# Patient Record
Sex: MALE | Race: WHITE | Employment: OTHER | URBAN - METROPOLITAN AREA
[De-identification: names, ages, dates, MRNs, and addresses within clinical notes are randomized per-mention and may not be internally consistent; named-entity substitution may affect disease eponyms.]

---

## 2017-03-04 ENCOUNTER — HOSPITAL ENCOUNTER (OUTPATIENT)
Dept: MRI IMAGING | Age: 67
Discharge: HOME | End: 2017-03-04
Attending: NEUROLOGICAL SURGERY
Payer: MEDICARE

## 2017-03-04 DIAGNOSIS — G30.1 ALZHEIMER'S DISEASE WITH LATE ONSET (CODE) (HCC): ICD-10-CM

## 2017-03-04 PROCEDURE — 70551 MRI BRAIN STEM W/O DYE: CPT

## 2017-11-01 PROBLEM — F03.90 DEMENTIA WITHOUT BEHAVIORAL DISTURBANCE (HCC): Status: ACTIVE | Noted: 2017-11-01

## 2017-11-01 PROBLEM — F03.90 DEMENTIA WITHOUT BEHAVIORAL DISTURBANCE (HCC): Chronic | Status: ACTIVE | Noted: 2017-11-01

## 2017-11-01 PROBLEM — I10 ESSENTIAL HYPERTENSION: Status: ACTIVE | Noted: 2017-11-01

## 2017-11-01 PROBLEM — E78.00 PURE HYPERCHOLESTEROLEMIA: Chronic | Status: ACTIVE | Noted: 2017-11-01

## 2017-11-01 PROBLEM — K21.9 GASTROESOPHAGEAL REFLUX DISEASE WITHOUT ESOPHAGITIS: Chronic | Status: ACTIVE | Noted: 2017-11-01

## 2017-11-01 PROBLEM — E78.00 PURE HYPERCHOLESTEROLEMIA: Status: ACTIVE | Noted: 2017-11-01

## 2017-11-01 PROBLEM — E11.42 TYPE 2 DIABETES MELLITUS WITH DIABETIC POLYNEUROPATHY, WITHOUT LONG-TERM CURRENT USE OF INSULIN (HCC): Status: ACTIVE | Noted: 2017-11-01

## 2017-11-01 PROBLEM — E11.42 TYPE 2 DIABETES MELLITUS WITH DIABETIC POLYNEUROPATHY, WITHOUT LONG-TERM CURRENT USE OF INSULIN (HCC): Chronic | Status: ACTIVE | Noted: 2017-11-01

## 2017-11-01 PROBLEM — K21.9 GASTROESOPHAGEAL REFLUX DISEASE WITHOUT ESOPHAGITIS: Status: ACTIVE | Noted: 2017-11-01

## 2017-11-01 PROBLEM — I10 ESSENTIAL HYPERTENSION: Chronic | Status: ACTIVE | Noted: 2017-11-01

## 2017-11-10 PROBLEM — F33.1 MODERATE EPISODE OF RECURRENT MAJOR DEPRESSIVE DISORDER (HCC): Chronic | Status: ACTIVE | Noted: 2017-11-10

## 2017-11-10 PROBLEM — E78.2 MIXED HYPERLIPIDEMIA: Chronic | Status: ACTIVE | Noted: 2017-11-10

## 2017-11-10 PROBLEM — E78.00 PURE HYPERCHOLESTEROLEMIA: Chronic | Status: RESOLVED | Noted: 2017-11-01 | Resolved: 2017-11-10

## 2017-11-12 PROBLEM — J30.1 SEASONAL ALLERGIC RHINITIS DUE TO POLLEN: Chronic | Status: ACTIVE | Noted: 2017-11-12

## 2017-11-12 PROBLEM — L21.9 SEBORRHEIC DERMATITIS: Chronic | Status: ACTIVE | Noted: 2017-11-12

## 2018-01-09 ENCOUNTER — HOSPITAL ENCOUNTER (OUTPATIENT)
Dept: CT IMAGING | Age: 68
Discharge: HOME | End: 2018-01-09
Attending: OTOLARYNGOLOGY
Payer: MEDICARE

## 2018-01-09 DIAGNOSIS — R51.9 FREQUENT HEADACHES: ICD-10-CM

## 2018-01-09 DIAGNOSIS — J34.89 SINUS PRESSURE: ICD-10-CM

## 2018-01-09 PROCEDURE — 70486 CT MAXILLOFACIAL W/O DYE: CPT

## 2018-04-02 PROBLEM — E11.21 TYPE 2 DIABETES WITH NEPHROPATHY (HCC): Status: ACTIVE | Noted: 2018-04-02

## 2018-05-29 VITALS — HEIGHT: 74 IN | BODY MASS INDEX: 23.1 KG/M2 | WEIGHT: 180 LBS

## 2018-05-29 RX ORDER — OXYMETAZOLINE HCL 0.05 %
2 SPRAY, NON-AEROSOL (ML) NASAL
Status: CANCELLED | OUTPATIENT
Start: 2018-06-05

## 2018-05-29 NOTE — PERIOP NOTES
Patient verified name, , and surgery as listed in [facility], [place]. Type 1b surgery, phone assessment completed with patient's wife, Isa De Guzman has dementia. Orders YES received. Labs per surgeon: none  Labs per anesthesia protocol: potassium~to be drawn on day of surgery      Patient answered medical/surgical history questions at their best of ability. All prior to admission medications documented in [facility]. Patient instructed to take the following medications the day of surgery according to anesthesia guidelines with a small sip of water: Ativan, Namenda, Pantoprazole, Effexor . Hold all vitamins 7 days prior to surgery and NSAIDS 5 days prior to surgery. Medications to be held none    Patient instructed on the following:  Arrive at A Entrance, time of arrival to be called the day before by 1700  NPO after midnight including gum, mints, and ice chips  Responsible adult must drive patient to the hospital, stay during surgery, and patient will  need supervision 24 hours after anesthesia  Use antibacterial soap in shower the night before surgery and on the morning of surgery  Leave all valuables (money and jewelry) at home but bring insurance card and ID on       DOS  Do not wear make-up, nail polish, lotions, cologne, perfumes, powders, or oil on skin. Patient teach back successful and patient demonstrates knowledge of instruction.

## 2018-05-30 ENCOUNTER — HOSPITAL ENCOUNTER (OUTPATIENT)
Dept: SURGERY | Age: 68
Discharge: HOME | End: 2018-05-30

## 2018-06-04 ENCOUNTER — ANESTHESIA EVENT (OUTPATIENT)
Dept: SURGERY | Age: 68
End: 2018-06-04
Payer: MEDICARE

## 2018-06-05 ENCOUNTER — ANESTHESIA (OUTPATIENT)
Dept: SURGERY | Age: 68
End: 2018-06-05
Payer: MEDICARE

## 2018-06-05 ENCOUNTER — HOSPITAL ENCOUNTER (OUTPATIENT)
Age: 68
Setting detail: OUTPATIENT SURGERY
Discharge: HOME | End: 2018-06-05
Attending: OTOLARYNGOLOGY | Admitting: OTOLARYNGOLOGY
Payer: MEDICARE

## 2018-06-05 VITALS
OXYGEN SATURATION: 97 % | BODY MASS INDEX: 23.1 KG/M2 | SYSTOLIC BLOOD PRESSURE: 152 MMHG | TEMPERATURE: 98.2 F | HEART RATE: 88 BPM | HEIGHT: 74 IN | RESPIRATION RATE: 15 BRPM | DIASTOLIC BLOOD PRESSURE: 76 MMHG | WEIGHT: 180 LBS

## 2018-06-05 LAB
GLUCOSE BLD STRIP.AUTO-MCNC: 111 MG/DL (ref 65–100)
POTASSIUM BLD-SCNC: 4.6 MMOL/L (ref 3.5–5.1)

## 2018-06-05 PROCEDURE — 76060000033 HC ANESTHESIA 1 TO 1.5 HR: Performed by: OTOLARYNGOLOGY

## 2018-06-05 PROCEDURE — 77030006907 HC BLD SNUS SHV MEDT -C: Performed by: OTOLARYNGOLOGY

## 2018-06-05 PROCEDURE — 74011250636 HC RX REV CODE- 250/636: Performed by: ANESTHESIOLOGY

## 2018-06-05 PROCEDURE — 77030036727 HC DEV INFL BLN SNUS SE DISP ACCL -B: Performed by: OTOLARYNGOLOGY

## 2018-06-05 PROCEDURE — 77030002888 HC SUT CHRMC J&J -A: Performed by: OTOLARYNGOLOGY

## 2018-06-05 PROCEDURE — 76010000149 HC OR TIME 1 TO 1.5 HR: Performed by: OTOLARYNGOLOGY

## 2018-06-05 PROCEDURE — 74011250636 HC RX REV CODE- 250/636

## 2018-06-05 PROCEDURE — 77030008357 HC SPLNT NSL INT THOM -B: Performed by: OTOLARYNGOLOGY

## 2018-06-05 PROCEDURE — 77030021678 HC GLIDESCP STAT DISP VERT -B: Performed by: ANESTHESIOLOGY

## 2018-06-05 PROCEDURE — 77030018836 HC SOL IRR NACL ICUM -A: Performed by: OTOLARYNGOLOGY

## 2018-06-05 PROCEDURE — 74011250637 HC RX REV CODE- 250/637: Performed by: OTOLARYNGOLOGY

## 2018-06-05 PROCEDURE — 74011000250 HC RX REV CODE- 250: Performed by: OTOLARYNGOLOGY

## 2018-06-05 PROCEDURE — 77030014153 HC WND COBLATN ENT S&N -C: Performed by: OTOLARYNGOLOGY

## 2018-06-05 PROCEDURE — 84132 ASSAY OF SERUM POTASSIUM: CPT

## 2018-06-05 PROCEDURE — 77030008703 HC TU ET UNCUF COVD -A: Performed by: ANESTHESIOLOGY

## 2018-06-05 PROCEDURE — 82962 GLUCOSE BLOOD TEST: CPT

## 2018-06-05 PROCEDURE — 77030002996 HC SUT SLK J&J -A: Performed by: OTOLARYNGOLOGY

## 2018-06-05 PROCEDURE — 74011250637 HC RX REV CODE- 250/637: Performed by: ANESTHESIOLOGY

## 2018-06-05 PROCEDURE — 77030036884 HC CATH GD SPNPLS RELV TIP DISP KT ACCL -G1: Performed by: OTOLARYNGOLOGY

## 2018-06-05 PROCEDURE — 77030020782 HC GWN BAIR PAWS FLX 3M -B: Performed by: ANESTHESIOLOGY

## 2018-06-05 PROCEDURE — 77030009845 HC ONTMNT BACITRCN PATT -A: Performed by: OTOLARYNGOLOGY

## 2018-06-05 PROCEDURE — 77030020255 HC SOL INJ LR 1000ML BG: Performed by: OTOLARYNGOLOGY

## 2018-06-05 PROCEDURE — 74011000250 HC RX REV CODE- 250

## 2018-06-05 PROCEDURE — 76210000016 HC OR PH I REC 1 TO 1.5 HR: Performed by: OTOLARYNGOLOGY

## 2018-06-05 PROCEDURE — 77030008477 HC STYL SATN SLP COVD -A: Performed by: ANESTHESIOLOGY

## 2018-06-05 RX ORDER — OXYMETAZOLINE HCL 0.05 %
2 SPRAY, NON-AEROSOL (ML) NASAL ONCE
Status: COMPLETED | OUTPATIENT
Start: 2018-06-05 | End: 2018-06-05

## 2018-06-05 RX ORDER — LIDOCAINE HYDROCHLORIDE 10 MG/ML
0.1 INJECTION INFILTRATION; PERINEURAL AS NEEDED
Status: DISCONTINUED | OUTPATIENT
Start: 2018-06-05 | End: 2018-06-05 | Stop reason: HOSPADM

## 2018-06-05 RX ORDER — DEXAMETHASONE SODIUM PHOSPHATE 4 MG/ML
INJECTION, SOLUTION INTRA-ARTICULAR; INTRALESIONAL; INTRAMUSCULAR; INTRAVENOUS; SOFT TISSUE AS NEEDED
Status: DISCONTINUED | OUTPATIENT
Start: 2018-06-05 | End: 2018-06-05 | Stop reason: HOSPADM

## 2018-06-05 RX ORDER — SODIUM CHLORIDE 0.9 % (FLUSH) 0.9 %
5-10 SYRINGE (ML) INJECTION AS NEEDED
Status: DISCONTINUED | OUTPATIENT
Start: 2018-06-05 | End: 2018-06-05 | Stop reason: HOSPADM

## 2018-06-05 RX ORDER — MIDAZOLAM HYDROCHLORIDE 1 MG/ML
2 INJECTION, SOLUTION INTRAMUSCULAR; INTRAVENOUS
Status: DISCONTINUED | OUTPATIENT
Start: 2018-06-05 | End: 2018-06-05 | Stop reason: HOSPADM

## 2018-06-05 RX ORDER — ACETAMINOPHEN 500 MG
1000 TABLET ORAL ONCE
Status: COMPLETED | OUTPATIENT
Start: 2018-06-05 | End: 2018-06-05

## 2018-06-05 RX ORDER — NEOSTIGMINE METHYLSULFATE 1 MG/ML
INJECTION INTRAVENOUS AS NEEDED
Status: DISCONTINUED | OUTPATIENT
Start: 2018-06-05 | End: 2018-06-05 | Stop reason: HOSPADM

## 2018-06-05 RX ORDER — ALBUTEROL SULFATE 0.83 MG/ML
2.5 SOLUTION RESPIRATORY (INHALATION) AS NEEDED
Status: DISCONTINUED | OUTPATIENT
Start: 2018-06-05 | End: 2018-06-05 | Stop reason: HOSPADM

## 2018-06-05 RX ORDER — FENTANYL CITRATE 50 UG/ML
INJECTION, SOLUTION INTRAMUSCULAR; INTRAVENOUS AS NEEDED
Status: DISCONTINUED | OUTPATIENT
Start: 2018-06-05 | End: 2018-06-05 | Stop reason: HOSPADM

## 2018-06-05 RX ORDER — ROCURONIUM BROMIDE 10 MG/ML
INJECTION, SOLUTION INTRAVENOUS AS NEEDED
Status: DISCONTINUED | OUTPATIENT
Start: 2018-06-05 | End: 2018-06-05 | Stop reason: HOSPADM

## 2018-06-05 RX ORDER — LIDOCAINE HYDROCHLORIDE AND EPINEPHRINE 10; 10 MG/ML; UG/ML
INJECTION, SOLUTION INFILTRATION; PERINEURAL AS NEEDED
Status: DISCONTINUED | OUTPATIENT
Start: 2018-06-05 | End: 2018-06-05 | Stop reason: HOSPADM

## 2018-06-05 RX ORDER — ONDANSETRON 2 MG/ML
4 INJECTION INTRAMUSCULAR; INTRAVENOUS
Status: DISCONTINUED | OUTPATIENT
Start: 2018-06-05 | End: 2018-06-05 | Stop reason: HOSPADM

## 2018-06-05 RX ORDER — LIDOCAINE HYDROCHLORIDE 20 MG/ML
INJECTION, SOLUTION EPIDURAL; INFILTRATION; INTRACAUDAL; PERINEURAL AS NEEDED
Status: DISCONTINUED | OUTPATIENT
Start: 2018-06-05 | End: 2018-06-05 | Stop reason: HOSPADM

## 2018-06-05 RX ORDER — KETOROLAC TROMETHAMINE 30 MG/ML
INJECTION, SOLUTION INTRAMUSCULAR; INTRAVENOUS AS NEEDED
Status: DISCONTINUED | OUTPATIENT
Start: 2018-06-05 | End: 2018-06-05 | Stop reason: HOSPADM

## 2018-06-05 RX ORDER — ONDANSETRON 2 MG/ML
INJECTION INTRAMUSCULAR; INTRAVENOUS AS NEEDED
Status: DISCONTINUED | OUTPATIENT
Start: 2018-06-05 | End: 2018-06-05 | Stop reason: HOSPADM

## 2018-06-05 RX ORDER — HYDROMORPHONE HYDROCHLORIDE 2 MG/ML
0.5 INJECTION, SOLUTION INTRAMUSCULAR; INTRAVENOUS; SUBCUTANEOUS
Status: DISCONTINUED | OUTPATIENT
Start: 2018-06-05 | End: 2018-06-05 | Stop reason: HOSPADM

## 2018-06-05 RX ORDER — GLYCOPYRROLATE 0.2 MG/ML
INJECTION INTRAMUSCULAR; INTRAVENOUS AS NEEDED
Status: DISCONTINUED | OUTPATIENT
Start: 2018-06-05 | End: 2018-06-05 | Stop reason: HOSPADM

## 2018-06-05 RX ORDER — SODIUM CHLORIDE, SODIUM LACTATE, POTASSIUM CHLORIDE, CALCIUM CHLORIDE 600; 310; 30; 20 MG/100ML; MG/100ML; MG/100ML; MG/100ML
1000 INJECTION, SOLUTION INTRAVENOUS CONTINUOUS
Status: DISCONTINUED | OUTPATIENT
Start: 2018-06-05 | End: 2018-06-05 | Stop reason: HOSPADM

## 2018-06-05 RX ORDER — PROPOFOL 10 MG/ML
INJECTION, EMULSION INTRAVENOUS AS NEEDED
Status: DISCONTINUED | OUTPATIENT
Start: 2018-06-05 | End: 2018-06-05 | Stop reason: HOSPADM

## 2018-06-05 RX ORDER — SODIUM CHLORIDE 0.9 % (FLUSH) 0.9 %
5-10 SYRINGE (ML) INJECTION EVERY 8 HOURS
Status: DISCONTINUED | OUTPATIENT
Start: 2018-06-05 | End: 2018-06-05 | Stop reason: HOSPADM

## 2018-06-05 RX ORDER — EPHEDRINE SULFATE 50 MG/ML
INJECTION, SOLUTION INTRAVENOUS AS NEEDED
Status: DISCONTINUED | OUTPATIENT
Start: 2018-06-05 | End: 2018-06-05 | Stop reason: HOSPADM

## 2018-06-05 RX ADMIN — SODIUM CHLORIDE, SODIUM LACTATE, POTASSIUM CHLORIDE, AND CALCIUM CHLORIDE 1000 ML: 600; 310; 30; 20 INJECTION, SOLUTION INTRAVENOUS at 07:27

## 2018-06-05 RX ADMIN — ONDANSETRON 4 MG: 2 INJECTION INTRAMUSCULAR; INTRAVENOUS at 08:13

## 2018-06-05 RX ADMIN — LIDOCAINE HYDROCHLORIDE 100 MG: 20 INJECTION, SOLUTION EPIDURAL; INFILTRATION; INTRACAUDAL; PERINEURAL at 07:57

## 2018-06-05 RX ADMIN — ROCURONIUM BROMIDE 10 MG: 10 INJECTION, SOLUTION INTRAVENOUS at 08:33

## 2018-06-05 RX ADMIN — EPHEDRINE SULFATE 10 MG: 50 INJECTION, SOLUTION INTRAVENOUS at 08:43

## 2018-06-05 RX ADMIN — OXYMETAZOLINE HYDROCHLORIDE 2 SPRAY: 5 SPRAY NASAL at 07:25

## 2018-06-05 RX ADMIN — EPHEDRINE SULFATE 5 MG: 50 INJECTION, SOLUTION INTRAVENOUS at 08:14

## 2018-06-05 RX ADMIN — ACETAMINOPHEN 1000 MG: 500 TABLET, FILM COATED ORAL at 07:25

## 2018-06-05 RX ADMIN — EPHEDRINE SULFATE 10 MG: 50 INJECTION, SOLUTION INTRAVENOUS at 08:17

## 2018-06-05 RX ADMIN — ROCURONIUM BROMIDE 10 MG: 10 INJECTION, SOLUTION INTRAVENOUS at 08:47

## 2018-06-05 RX ADMIN — NEOSTIGMINE METHYLSULFATE 3 MG: 1 INJECTION INTRAVENOUS at 09:00

## 2018-06-05 RX ADMIN — DEXAMETHASONE SODIUM PHOSPHATE 10 MG: 4 INJECTION, SOLUTION INTRA-ARTICULAR; INTRALESIONAL; INTRAMUSCULAR; INTRAVENOUS; SOFT TISSUE at 08:12

## 2018-06-05 RX ADMIN — PROPOFOL 150 MG: 10 INJECTION, EMULSION INTRAVENOUS at 07:57

## 2018-06-05 RX ADMIN — GLYCOPYRROLATE 0.4 MG: 0.2 INJECTION INTRAMUSCULAR; INTRAVENOUS at 09:00

## 2018-06-05 RX ADMIN — ROCURONIUM BROMIDE 30 MG: 10 INJECTION, SOLUTION INTRAVENOUS at 07:57

## 2018-06-05 RX ADMIN — EPHEDRINE SULFATE 10 MG: 50 INJECTION, SOLUTION INTRAVENOUS at 08:46

## 2018-06-05 RX ADMIN — FENTANYL CITRATE 50 MCG: 50 INJECTION, SOLUTION INTRAMUSCULAR; INTRAVENOUS at 07:57

## 2018-06-05 RX ADMIN — ROCURONIUM BROMIDE 10 MG: 10 INJECTION, SOLUTION INTRAVENOUS at 08:19

## 2018-06-05 RX ADMIN — SODIUM CHLORIDE, SODIUM LACTATE, POTASSIUM CHLORIDE, AND CALCIUM CHLORIDE: 600; 310; 30; 20 INJECTION, SOLUTION INTRAVENOUS at 08:34

## 2018-06-05 NOTE — BRIEF OP NOTE
BRIEF OPERATIVE NOTE    Date of Procedure: 6/5/2018   Preoperative Diagnosis: Chronic sinusitis [J32.9]  Deviated septum [J34.2]  Nasal turbinate hypertrophy [J34.3]  Nasal obstruction [J34.89]  Postoperative Diagnosis: Chronic sinusitis [J32.9]  Deviated septum [J34.2]  Nasal turbinate hypertrophy [J34.3]  Nasal obstruction [J34.89]    Procedure(s):  SEPTOPLASTY  FESS BMA BTE BILATERAL FRONTAL BALLOON SINUPLASTY WITH  RIGHT IRRIGATION Caut OFIT    Surgeon(s) and Role:     * Jocelyn Colorado MD - Primary         Surgical Assistant: none    Surgical Staff:  Circ-1: Melody Langley RN  Scrub Tech-1: Melida Canfernandez  Event Time In   Incision Start 2899   Incision Close 0859     Anesthesia: General   Estimated Blood Loss: 20 cc  Specimens: * No specimens in log *   Findings: DNS ITH Chronic sinusitis  Thick debris right frontal sinus   Complications: none    Implants: * No implants in log *

## 2018-06-05 NOTE — ANESTHESIA PREPROCEDURE EVALUATION
Anesthetic History   No history of anesthetic complications            Review of Systems / Medical History  Patient summary reviewed and pertinent labs reviewed    Pulmonary  Within defined limits                 Neuro/Psych         Psychiatric history     Cardiovascular    Hypertension          Hyperlipidemia    Exercise tolerance: >4 METS     GI/Hepatic/Renal     GERD           Endo/Other    Diabetes: type 2    Arthritis     Other Findings              Physical Exam    Airway  Mallampati: II  TM Distance: 4 - 6 cm  Neck ROM: normal range of motion   Mouth opening: Normal     Cardiovascular    Rhythm: regular  Rate: normal      Pertinent negatives: No murmur, JVD and peripheral edema   Dental  No notable dental hx       Pulmonary  Breath sounds clear to auscultation               Abdominal         Other Findings            Anesthetic Plan    ASA: 2  Anesthesia type: general          Induction: Intravenous  Anesthetic plan and risks discussed with: Patient      VERO

## 2018-06-05 NOTE — DISCHARGE INSTRUCTIONS
Please refer to discharge instructions given in Dr. Suzanne Gray office. If you have any problems or question please call his office at [phone]    Instructions after Anesthesia:    For the first 24 hours DO NOT Drive,Drink alcoholic beverages, or Make important decisions. Be aware of dizziness following anesthesia and while taking pain medication. NASAL SURGERY            Things to Remember After Surgery    1. Sleep and rest with head elevated on several pillows to decrease swelling and pressure. 2. You will have a 2 X 2 gauze under your nose to absorb the bloody discharge you will have the first 2 to 3 days after surgery. If you saturate the gauze more than 3 times in 30 minutes, please notify the office. 3. Numbness to the front teeth after nasal surgery is normal. The feeling usually returns in 6 to 8 weeks. 4. Clean the end of your nose with hydrogen peroxide. Do not try to clean inside. It may cause bleeding. 5. Do not blow your nose until you have had your first post-op appointment ( at least one week after surgery). 6. For a mild pain and a low-grade temperature, you may take Tylenol. No aspirin, Motrin, Advil, or Aleve for at least 3 weeks after nasal surgery. 7. If the nasal surgery requires an exterior cast and the cast falls off, please notify the office during office hours. 8. Nasal congestion after surgery is normal and will resolve after the splints and/or packing are removed.     ·

## 2018-06-05 NOTE — OP NOTES
[facility] REPORT    Crystal George  MR#: [id number]  : 1950  ACCOUNT #: [de-identified]   DATE OF SERVICE: 2018    PREOPERATIVE DIAGNOSIS:  Chronic and recurrent sinusitis, deviated nasal septum, inferior turbinate hypertrophy, nasal obstruction. POSTOPERATIVE DIAGNOSIS:  Chronic and recurrent sinusitis, deviated nasal septum, inferior turbinate hypertrophy, nasal obstruction. PROCEDURE:  Septoplasty, functional endoscopic sinus surgery with bilateral maxillary antrostomy, bilateral total ethmoidectomy, bilateral frontal balloon sinuplasty with right irrigation, cauterization and outfracture of the inferior turbinates. SURGEON:  Kelsi      ASSISTANT:  none     ANESTHESIA:  general     ESTIMATED BLOOD LOSS:  20 cc    SPECIMENS REMOVED:  none    COMPLICATIONS:  none    IMPLANTS:  none     FINDINGS:  The patient had a deviated nasal septum to the right involving the bony and cartilaginous septum. He had inferior turbinate hypertrophy bilaterally and obscured maxillary antrostomies bilaterally. He had thickened mucosa throughout, scattered ethmoid air cells. There was very thick debris irrigated from the right frontal sinus. OPERATION:  The patient was placed in supine position. General endotracheal anesthesia was induced. Xylocaine 1% with 1:100,000 epinephrine was injected into the middle turbinates and septal mucosa. Afrin-soaked cotton pledgets were placed in nasal cavity bilaterally. After a ten minute waiting period, he was prepped and draped in normal sterile fashion. Nasal vibrissae were trimmed. Cotton pledgets were removed. A right hemitransfixion incision was performed. Right mucoperichondrial and periosteal flap was elevated. A portion of cartilage was removed from the floor of the nose and the septum was disarticulated at the bony-cartilaginous junction. This placed the cartilaginous septum into midline.   Then bilateral mucoperiosteal flaps were elevated and deviated bony septum was removed with Maximo-Brewer. With the septum in good alignment. a drainage port was made through the floor of the right mucoperiosteal flap. Attention was turned to the left middle meatal area. The middle turbinate was infractured. The frontal sinus guide was placed in the middle meatal area. A Luisa wire was then placed through the frontal recess into the frontal sinus, confirmed with light visualization. Balloon was placed over the wire and inflated to 12 atmospheres of pressure. This procedure was repeated on the right side as well, but in addition, the right frontal sinus was irrigated with 240 mL of saline. A large amount of thick debris was irrigated from the right frontal sinus. Then, attention was turned to the left middle meatal area. With endoscopic visualization, the uncinate process was elevated with the seeker and removed with backbiters as well as microdebrider. There was extremely obscured maxillary antrostomies. Then the ethmoid bulla was taken down with a microdebrider beginning anteromedially, removing thickened mucosa throughout scattered ethmoid air cells through the ground lamella area into the posterior ethmoid almost to the roof of the ethmoid sinus and almost to the lamina papyracea. This procedure was performed on the right side with similar findings. Then, the Coblator was used at a setting of 4 to treat the medial surface of the inferior turbinates. Inferior turbinates were outfractured. Coagulated blood was suctioned from beneath the septal flap. Hemitransfixion incision was reapproximated with interrupted 4-0 chromic. Then coagulated blood was suctioned from the sinus cavities. Folded Merogel was placed in the middle meatal area bilaterally. Kent splints were placed and anchored anteriorly with 2-0 silk. The patient was then awakened and transferred to recovery room in good condition. Estimated blood loss 20 mL.       MD Vivi Albright / [place]  D: 06/05/2018 09:25     T: 06/05/2018 10:26  JOB #: [id number]

## 2018-06-05 NOTE — ANESTHESIA POSTPROCEDURE EVALUATION
Post-Anesthesia Evaluation and Assessment    Patient: Alberto Mi MRN: [id number]  SSN: [id number]    YOB: 1950  Age: 70 y.o. Sex: male       Cardiovascular Function/Vital Signs  Visit Vitals    /76    Pulse 91    Temp 36.8 °C (98.2 °F)    Resp 15    Ht 6' 2\" (1.88 m)    Wt 81.6 kg (180 lb)    SpO2 100%    BMI 23.11 kg/m2       Patient is status post general anesthesia for Procedure(s):  SEPTOPLASTY  BILATERAL FRONTAL BALLOON SINUPLASTY WITH POSSIBLE BIOPSY AND RIGHT IRRIGATION  FUNCTIONAL ENDOSCOPIC SINUS SURGERY. Nausea/Vomiting: None    Postoperative hydration reviewed and adequate. Pain:  Pain Scale 1: Visual (06/05/18 0912)  Pain Intensity 1: 0 (06/05/18 0912)   Managed    Neurological Status:   Neuro (WDL): Within Defined Limits (06/05/18 0912)  Neuro  Neurologic State: Confused (06/05/18 3474)  Orientation Level: Disoriented to place; Disoriented to situation;Oriented to person (06/05/18 0917)  Speech: Clear (06/05/18 0917)  LUE Motor Response: Purposeful (06/05/18 0912)  LLE Motor Response: Purposeful (06/05/18 0912)  RUE Motor Response: Purposeful (06/05/18 0912)  RLE Motor Response: Purposeful (06/05/18 0912)   At baseline    Mental Status and Level of Consciousness: Arousable    Pulmonary Status:   O2 Device: Room air (06/05/18 0942)   Adequate oxygenation and airway patent    Complications related to anesthesia: None    Post-anesthesia assessment completed.  No concerns    Signed By: Esvin Martinez MD     June 5, 2018

## 2018-06-28 PROBLEM — E11.21 TYPE 2 DIABETES WITH NEPHROPATHY (HCC): Chronic | Status: ACTIVE | Noted: 2018-04-02

## 2018-07-19 PROBLEM — F13.20 BENZODIAZEPINE DEPENDENCE (HCC): Chronic | Status: ACTIVE | Noted: 2018-07-19

## 2018-07-19 PROBLEM — R74.8 ELEVATED LIVER ENZYMES: Status: ACTIVE | Noted: 2018-07-19

## 2018-08-09 ENCOUNTER — HOSPITAL ENCOUNTER (OUTPATIENT)
Dept: ULTRASOUND IMAGING | Age: 68
Discharge: HOME | End: 2018-08-09
Attending: FAMILY MEDICINE
Payer: MEDICARE

## 2018-08-09 DIAGNOSIS — R74.8 ELEVATED LIVER ENZYMES: ICD-10-CM

## 2018-08-09 PROCEDURE — 76705 ECHO EXAM OF ABDOMEN: CPT

## 2018-08-09 NOTE — PROGRESS NOTES
Liver shows fatty infiltration. Needs to eat low fat diet, avoid alcohol, lose weight.  Discuss at next visit

## 2018-09-17 PROBLEM — F13.20 BENZODIAZEPINE DEPENDENCE (HCC): Chronic | Status: RESOLVED | Noted: 2018-07-19 | Resolved: 2018-09-17

## 2018-09-17 PROBLEM — R09.81 NASAL CONGESTION: Status: ACTIVE | Noted: 2018-09-17

## 2019-12-09 PROBLEM — L97.501 NON-PRESSURE CHRONIC ULCER OF OTHER PART OF UNSPECIFIED FOOT LIMITED TO BREAKDOWN OF SKIN (HCC): Status: ACTIVE | Noted: 2019-12-09

## 2019-12-09 PROBLEM — L91.9 HYPERTROPHIC DISORDER OF SKIN: Status: ACTIVE | Noted: 2019-12-09

## 2019-12-09 PROBLEM — M79.671 PAIN IN RIGHT FOOT: Status: ACTIVE | Noted: 2019-12-09

## 2019-12-28 ENCOUNTER — APPOINTMENT (OUTPATIENT)
Dept: CT IMAGING | Age: 69
End: 2019-12-28
Attending: EMERGENCY MEDICINE
Payer: MEDICARE

## 2019-12-28 ENCOUNTER — HOSPITAL ENCOUNTER (EMERGENCY)
Age: 69
Discharge: HOME | End: 2019-12-28
Attending: EMERGENCY MEDICINE
Payer: MEDICARE

## 2019-12-28 ENCOUNTER — APPOINTMENT (OUTPATIENT)
Dept: GENERAL RADIOLOGY | Age: 69
End: 2019-12-28
Attending: EMERGENCY MEDICINE
Payer: MEDICARE

## 2019-12-28 VITALS
HEIGHT: 77 IN | DIASTOLIC BLOOD PRESSURE: 72 MMHG | BODY MASS INDEX: 21.84 KG/M2 | HEART RATE: 95 BPM | RESPIRATION RATE: 18 BRPM | TEMPERATURE: 98 F | WEIGHT: 185 LBS | OXYGEN SATURATION: 96 % | SYSTOLIC BLOOD PRESSURE: 150 MMHG

## 2019-12-28 DIAGNOSIS — E86.0 DEHYDRATION: Primary | ICD-10-CM

## 2019-12-28 LAB
ALBUMIN SERPL-MCNC: 3.1 G/DL (ref 3.2–4.6)
ALBUMIN/GLOB SERPL: 0.7 {RATIO} (ref 1.2–3.5)
ALP SERPL-CCNC: 155 U/L (ref 50–136)
ALT SERPL-CCNC: 27 U/L (ref 12–65)
ANION GAP SERPL CALC-SCNC: 11 MMOL/L (ref 7–16)
AST SERPL-CCNC: 26 U/L (ref 15–37)
BASOPHILS # BLD: 0.1 K/UL (ref 0–0.2)
BASOPHILS NFR BLD: 1 % (ref 0–2)
BILIRUB SERPL-MCNC: 0.2 MG/DL (ref 0.2–1.1)
BUN SERPL-MCNC: 11 MG/DL (ref 8–23)
CALCIUM SERPL-MCNC: 8.6 MG/DL (ref 8.3–10.4)
CHLORIDE SERPL-SCNC: 102 MMOL/L (ref 98–107)
CO2 SERPL-SCNC: 24 MMOL/L (ref 21–32)
CREAT SERPL-MCNC: 1.23 MG/DL (ref 0.8–1.5)
DIFFERENTIAL METHOD BLD: ABNORMAL
EOSINOPHIL # BLD: 0.3 K/UL (ref 0–0.8)
EOSINOPHIL NFR BLD: 3 % (ref 0.5–7.8)
ERYTHROCYTE [DISTWIDTH] IN BLOOD BY AUTOMATED COUNT: 12.5 % (ref 11.9–14.6)
GLOBULIN SER CALC-MCNC: 4.2 G/DL (ref 2.3–3.5)
GLUCOSE SERPL-MCNC: 97 MG/DL (ref 65–100)
HCT VFR BLD AUTO: 33.3 % (ref 41.1–50.3)
HGB BLD-MCNC: 10.7 G/DL (ref 13.6–17.2)
IMM GRANULOCYTES # BLD AUTO: 0 K/UL (ref 0–0.5)
IMM GRANULOCYTES NFR BLD AUTO: 1 % (ref 0–5)
LYMPHOCYTES # BLD: 2.5 K/UL (ref 0.5–4.6)
LYMPHOCYTES NFR BLD: 32 % (ref 13–44)
MCH RBC QN AUTO: 31.7 PG (ref 26.1–32.9)
MCHC RBC AUTO-ENTMCNC: 32.1 G/DL (ref 31.4–35)
MCV RBC AUTO: 98.5 FL (ref 79.6–97.8)
MONOCYTES # BLD: 0.7 K/UL (ref 0.1–1.3)
MONOCYTES NFR BLD: 9 % (ref 4–12)
NEUTS SEG # BLD: 4.1 K/UL (ref 1.7–8.2)
NEUTS SEG NFR BLD: 54 % (ref 43–78)
NRBC # BLD: 0 K/UL (ref 0–0.2)
PLATELET # BLD AUTO: 362 K/UL (ref 150–450)
PMV BLD AUTO: 9.9 FL (ref 9.4–12.3)
POTASSIUM SERPL-SCNC: 3.9 MMOL/L (ref 3.5–5.1)
PROT SERPL-MCNC: 7.3 G/DL (ref 6.3–8.2)
RBC # BLD AUTO: 3.38 M/UL (ref 4.23–5.6)
SODIUM SERPL-SCNC: 137 MMOL/L (ref 136–145)
TROPONIN I SERPL-MCNC: <0.02 NG/ML (ref 0.02–0.05)
WBC # BLD AUTO: 7.6 K/UL (ref 4.3–11.1)

## 2019-12-28 PROCEDURE — 96360 HYDRATION IV INFUSION INIT: CPT | Performed by: EMERGENCY MEDICINE

## 2019-12-28 PROCEDURE — 70450 CT HEAD/BRAIN W/O DYE: CPT

## 2019-12-28 PROCEDURE — 74011250636 HC RX REV CODE- 250/636: Performed by: EMERGENCY MEDICINE

## 2019-12-28 PROCEDURE — 80053 COMPREHEN METABOLIC PANEL: CPT

## 2019-12-28 PROCEDURE — 84484 ASSAY OF TROPONIN QUANT: CPT

## 2019-12-28 PROCEDURE — 85025 COMPLETE CBC W/AUTO DIFF WBC: CPT

## 2019-12-28 PROCEDURE — 93005 ELECTROCARDIOGRAM TRACING: CPT | Performed by: EMERGENCY MEDICINE

## 2019-12-28 PROCEDURE — 81003 URINALYSIS AUTO W/O SCOPE: CPT | Performed by: EMERGENCY MEDICINE

## 2019-12-28 PROCEDURE — 96361 HYDRATE IV INFUSION ADD-ON: CPT | Performed by: EMERGENCY MEDICINE

## 2019-12-28 PROCEDURE — 99285 EMERGENCY DEPT VISIT HI MDM: CPT | Performed by: EMERGENCY MEDICINE

## 2019-12-28 PROCEDURE — 71046 X-RAY EXAM CHEST 2 VIEWS: CPT

## 2019-12-28 RX ADMIN — SODIUM CHLORIDE 1000 ML: 900 INJECTION, SOLUTION INTRAVENOUS at 17:54

## 2019-12-28 NOTE — ED TRIAGE NOTES
EMS called to home for dizziness and unsteady gait. Unable to stand unassisted. Pt had orthostatic bp with EMS. Has hx of dementia. Given 400mL NS.  bgl 993, bp 100systolic, hr 814 standing. 20g RH.
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
## 2019-12-29 LAB
ATRIAL RATE: 100 BPM
CALCULATED P AXIS, ECG09: 43 DEGREES
CALCULATED R AXIS, ECG10: -58 DEGREES
CALCULATED T AXIS, ECG11: 32 DEGREES
DIAGNOSIS, 93000: NORMAL
P-R INTERVAL, ECG05: 184 MS
Q-T INTERVAL, ECG07: 376 MS
QRS DURATION, ECG06: 104 MS
QTC CALCULATION (BEZET), ECG08: 485 MS
VENTRICULAR RATE, ECG03: 100 BPM

## 2019-12-29 NOTE — ED NOTES
I have reviewed discharge instructions with the patient. The patient verbalized understanding. Patient left ED via Discharge Method: wheelchair to Home with daughter). Opportunity for questions and clarification provided. Patient given 0 scripts. To continue your aftercare when you leave the hospital, you may receive an automated call from our care team to check in on how you are doing. This is a free service and part of our promise to provide the best care and service to meet your aftercare needs.  If you have questions, or wish to unsubscribe from this service please call [phone]. Thank you for Choosing our [address] [place] [address] Emergency Department.

## 2019-12-29 NOTE — DISCHARGE INSTRUCTIONS
Patient Education        Dehydration: Care Instructions  Your Care Instructions  Dehydration happens when your body loses too much fluid. This might happen when you do not drink enough water or you lose large amounts of fluids from your body because of diarrhea, vomiting, or sweating. Severe dehydration can be life-threatening. Water and minerals called electrolytes help put your body fluids back in balance. Learn the early signs of fluid loss, and drink more fluids to prevent dehydration. Follow-up care is a key part of your treatment and safety. Be sure to make and go to all appointments, and call your doctor if you are having problems. It's also a good idea to know your test results and keep a list of the medicines you take. How can you care for yourself at home? · To prevent dehydration, drink plenty of fluids, enough so that your urine is light yellow or clear like water. Choose water and other caffeine-free clear liquids until you feel better. If you have kidney, heart, or liver disease and have to limit fluids, talk with your doctor before you increase the amount of fluids you drink. · If you do not feel like eating or drinking, try taking small sips of water, sports drinks, or other rehydration drinks. · Get plenty of rest.  To prevent dehydration  · Add more fluids to your diet and daily routine, unless your doctor has told you not to. · During hot weather, drink more fluids. Drink even more fluids if you exercise a lot. Stay away from drinks with alcohol or caffeine. · Watch for the symptoms of dehydration. These include:  ? A dry, sticky mouth. ? Dark yellow urine, and not much of it. ? Dry and sunken eyes. ? Feeling very tired. · Learn what problems can lead to dehydration. These include:  ? Diarrhea, fever, and vomiting. ? Any illness with a fever, such as pneumonia or the flu. ?  Activities that cause heavy sweating, such as endurance races and heavy outdoor work in hot or humid weather. ? Alcohol or drug use or problems caused by quitting their use (withdrawal). ? Certain medicines, such as cold and allergy pills (antihistamines), diet pills (diuretics), and laxatives. ? Certain diseases, such as diabetes, cancer, and heart or kidney disease. When should you call for help? Call 911 anytime you think you may need emergency care. For example, call if:    · You passed out (lost consciousness).    Call your doctor now or seek immediate medical care if:    · You are confused and cannot think clearly.     · You are dizzy or lightheaded, or you feel like you may faint.     · You have signs of needing more fluids. You have sunken eyes and a dry mouth, and you pass only a little dark urine.     · You cannot keep fluids down.    Watch closely for changes in your health, and be sure to contact your doctor if:    · You are not making tears.     · Your skin is very dry and sags slowly back into place after you pinch it.     · Your mouth and eyes are very dry. Where can you learn more? Go to http://yoni-lainey.info/. Enter T224 in the search box to learn more about \"Dehydration: Care Instructions. \"  Current as of: June 26, 2019  Content Version: 12.2  © 7525-8603 [employer]. Care instructions adapted under license by [employer] (which disclaims liability or warranty for this information). If you have questions about a medical condition or this instruction, always ask your healthcare professional. [place] [address] any warranty or liability for your use of this information. Patient Education        Dizziness: Care Instructions  Your Care Instructions  Dizziness is the feeling of unsteadiness or fuzziness in your head. It is different than having vertigo, which is a feeling that the room is spinning or that you are moving or falling. It is also different from lightheadedness, which is the feeling that you are about to faint.   It can be hard to know what causes dizziness. Some people feel dizzy when they have migraine headaches. Sometimes bouts of flu can make you feel dizzy. Some medical conditions, such as heart problems or high blood pressure, can make you feel dizzy. Many medicines can cause dizziness, including medicines for high blood pressure, pain, or anxiety. If a medicine causes your symptoms, your doctor may recommend that you stop or change the medicine. If it is a problem with your heart, you may need medicine to help your heart work better. If there is no clear reason for your symptoms, your doctor may suggest watching and waiting for a while to see if the dizziness goes away on its own. Follow-up care is a key part of your treatment and safety. Be sure to make and go to all appointments, and call your doctor if you are having problems. It's also a good idea to know your test results and keep a list of the medicines you take. How can you care for yourself at home? · If your doctor recommends or prescribes medicine, take it exactly as directed. Call your doctor if you think you are having a problem with your medicine. · Do not drive while you feel dizzy. · Try to prevent falls. Steps you can take include:  ? Using nonskid mats, adding grab bars near the tub, and using night-lights. ? Clearing your home so that walkways are free of anything you might trip on.  ? Letting family and friends know that you have been feeling dizzy. This will help them know how to help you. When should you call for help? Call 911 anytime you think you may need emergency care. For example, call if:    · You passed out (lost consciousness).     · You have dizziness along with symptoms of a heart attack. These may include:  ? Chest pain or pressure, or a strange feeling in the chest.  ? Sweating. ? Shortness of breath. ? Nausea or vomiting.   ? Pain, pressure, or a strange feeling in the back, neck, jaw, or upper belly or in one or both shoulders or arms. ? Lightheadedness or sudden weakness. ? A fast or irregular heartbeat.     · You have symptoms of a stroke. These may include:  ? Sudden numbness, tingling, weakness, or loss of movement in your face, arm, or leg, especially on only one side of your body. ? Sudden vision changes. ? Sudden trouble speaking. ? Sudden confusion or trouble understanding simple statements. ? Sudden problems with walking or balance. ? A sudden, severe headache that is different from past headaches.    Call your doctor now or seek immediate medical care if:    · You feel dizzy and have a fever, headache, or ringing in your ears.     · You have new or increased nausea and vomiting.     · Your dizziness does not go away or comes back.    Watch closely for changes in your health, and be sure to contact your doctor if:    · You do not get better as expected. Where can you learn more? Go to http://yoni-lainey.info/. Enter X325 in the search box to learn more about \"Dizziness: Care Instructions. \"  Current as of: June 26, 2019  Content Version: 12.2  © 8094-9196 [employer]. Care instructions adapted under license by [employer] (which disclaims liability or warranty for this information). If you have questions about a medical condition or this instruction, always ask your healthcare professional. [address] any warranty or liability for your use of this information.

## 2020-01-29 ENCOUNTER — PATIENT OUTREACH (OUTPATIENT)
Dept: CASE MANAGEMENT | Age: 70
End: 2020-01-29

## 2020-01-29 NOTE — PROGRESS NOTES
FIONA VICENTE called to try and reach out to pt/family but got their voice mail and had to leave a message. PLAN:  FIONA VICENTE will attempt to reach pt/family again tomorrow if FIONA VICENTE doesn't hear from them before then. This note will not be viewable in [address]

## 2020-01-30 ENCOUNTER — PATIENT OUTREACH (OUTPATIENT)
Dept: CASE MANAGEMENT | Age: 70
End: 2020-01-30

## 2020-01-30 NOTE — PROGRESS NOTES
FIONA VICENTE got a voice mail from pt's wife yesterday evening but got her voice mail today when FIONA VICENTE tried to call family back. PLAN:  FIONA VICENTE will attempt to reach pt/family again if FIONA VICENTE doesn't hear back from pt's family       This note will not be viewable in [address]

## 2020-01-31 ENCOUNTER — PATIENT OUTREACH (OUTPATIENT)
Dept: CASE MANAGEMENT | Age: 70
End: 2020-01-31

## 2020-01-31 NOTE — PROGRESS NOTES
Ambulatory Care Coordination  Social Work Assessment   Referral from: Dr. Edgar High    Previously referred? If so, reason and brief outcome No   Reason for current referral: Community resources   Income information (if needed): Pt makes 1200 a month and his wife makes 1100 a month    Sources of Support: Family    ACP set up? Needs information? Didnt talk about    Medication Cost assistance needed? NA   Referral to CLTC/Medicaid needed? Didnt want to talk about    Referral to Medicare Extra Help/LIS program needed? NA   Small home repair needed? NA   M[facility] referral? NA   Any other concerns/questions? NA   Next steps: See below        FIONA VICENTE got a call back from pt's wife who said that she wasn't really sure why FIONA VICENTE had reached out. FIONA VICENTE explained that she could help connect pt/family with community resources and supports. Pt's wife just kept saying that she feels like they need a new neurologist.  FIONA VICENTE tried to let pt's wife know that if they are wanting more supports and information that wouldn't really come from any neurologist that FIONA VICENTE and the team she works with are the ones that help provide community supports and information. Pt's wife just kept going back to feeling like they need a new specialist.  Pt's wife eventually said that she would like for FIONA VICENTE to talk with her daughter Adela Snowden and that she would get Adela Snowden to call FIONA VICENTE. PLAN:  FIONA VICENTE will follow up with pt's daughter Adela Snowden next week if FIONA VICENTE doesn't hear from her before then. This note will not be viewable in [address]

## 2020-01-31 NOTE — Clinical Note
Myra Chase wanted to touch base with you on this patient/family. I talked with the wife and she just kept saying she want's a new neurologist for the pt. She wasn't really open to listening to the supports or resources that I can help provide them with. She did say that she would get her daughter to call me and I plan to follow up next week if I don't hear from her before then.   Please feel free to call me at [phone] if you have time to discuss this case further

## 2020-02-04 ENCOUNTER — PATIENT OUTREACH (OUTPATIENT)
Dept: CASE MANAGEMENT | Age: 70
End: 2020-02-04

## 2020-02-04 NOTE — PROGRESS NOTES
FIONA VICENTE called pt's daughter Glendy Alvarez who said that they do really want to change pt's neurologist but that they also do want some information on supports for pt/family. FIONA VICENTE talked with pt's daughter about applying for Medicaid for pt in case he needs the support down the line and she said that they would think about that. FIONA VICENTE talked with Kingfisher Damme about the [facility] caregiver program and that they would also connect pt/family with the [employer] waver as well. Glendy Alvarez said that would be a great help. FIONA VICENTE did let her know that the program is steven funded and can take some time. She voiced understanding. FIONA VICENTE also provided Kingfisher Damme with information on the [employer] org web site for more support for her and pt's wife as they go through this process with pt. PLAN:  FIONA VICENTE explained that this FIONA VICENTE is about to go out on maternity leave but that if this FIONA VICENTE is still working in two weeks this FIONA VICENTE will follow up with Glendy Alvarez to see if she's gotten application from [facility] but if this FIONA VICENTE has gone out on leave FIONA Luu will follow up with them to see how things are going. This note will not be viewable in [address]

## 2020-02-04 NOTE — Clinical Note
Follow up,I wanted to let you both know that I am working to support his pt/family with resources but they DO still want a referral for another neurologist.  They feel like they would like to have a team working with them on pt's memory.   I don't know if you feel like a referral to the [facility] would be appropriate but I feel like that is the type of support they are wanting from the neurologist.  Milan Osorio. [address]

## 2020-02-12 ENCOUNTER — PATIENT OUTREACH (OUTPATIENT)
Dept: CASE MANAGEMENT | Age: 70
End: 2020-02-12

## 2020-02-12 NOTE — PROGRESS NOTES
1: 15 p.m.  FIONA VICENTE received incoming call from [employer], pt's step-emely. She reports that pt's condition has worsened over the past week. States that pt is agitated, delusional, hallucinating and has periods of trying to leave the home. She was instructed by neuro to have him evaluated by a \"dementia psychiatrist specialist\" but no further assistance was provided in this regard, per step emely. FIONA VICENTE reviewed chart. Given acute onset of worsening cognitive/behavioral functioning, FIONA VICENTE recommended that tyra bettsu [facility] and talk with assessment staff about steps for possible admission. 3:30 p.m. Follow up TC with [facility]. She contacted [facility]. They recommended that she activate 911 to transport pt to ER to be evaluated for commitment to [facility] for further eval and stabilization. Step emely in agreement with plan. FIONA VICENTE will follow up with Ms. Donnelly tomorrow. Dr. Yohan Tirado office updated. This note will not be viewable in [address]

## 2020-02-12 NOTE — PROGRESS NOTES
FIONA VICENTE got a call from PCP office stating that pt's daughter Bryanna Guadalupe was in the practice asking for more supports and a referral for more services for pt. FIONA VICENTE talked with PCP office about resources that this FIONA VICENTE has provided so far and that this FIONA VICENTE has reached out to PCP and MA about possible need for referral for other MD evaluation. FIONA VICENTE also talked with PCP office about the fact that this FIONA VICENTE would be going out on maternity leave soon. They wanted to provide pt's daughter with new FIONA VICENTE's info. FIONA VICENTE did speak with Radha Caenla the FIONA VICENTE on team who will be taking over pt's care and she said that she's talked with pt's family and is now actively engaged with them. PLAN:  This FIONA VICENTE will remove herself from pt's care team at this time and let new FIONA Jenkins take over case management for pt/family at this time. This note will not be viewable in [address]

## 2020-02-14 ENCOUNTER — PATIENT OUTREACH (OUTPATIENT)
Dept: CASE MANAGEMENT | Age: 70
End: 2020-02-14

## 2020-02-14 ENCOUNTER — HOSPITAL ENCOUNTER (EMERGENCY)
Age: 70
Discharge: HOME | End: 2020-02-14
Attending: EMERGENCY MEDICINE
Payer: MEDICARE

## 2020-02-14 VITALS
DIASTOLIC BLOOD PRESSURE: 68 MMHG | SYSTOLIC BLOOD PRESSURE: 140 MMHG | HEART RATE: 68 BPM | HEIGHT: 77 IN | RESPIRATION RATE: 18 BRPM | TEMPERATURE: 97.8 F | OXYGEN SATURATION: 100 % | WEIGHT: 185 LBS | BODY MASS INDEX: 21.84 KG/M2

## 2020-02-14 DIAGNOSIS — F22 PARANOIA (PSYCHOSIS) (HCC): Primary | ICD-10-CM

## 2020-02-14 DIAGNOSIS — F22 DELUSION OF PERSECUTION (HCC): ICD-10-CM

## 2020-02-14 LAB
ALBUMIN SERPL-MCNC: 3.3 G/DL (ref 3.2–4.6)
ALBUMIN/GLOB SERPL: 0.8 {RATIO} (ref 1.2–3.5)
ALP SERPL-CCNC: 194 U/L (ref 50–136)
ALT SERPL-CCNC: 225 U/L (ref 12–65)
AMMONIA PLAS-SCNC: <10 UMOL/L (ref 11–32)
AMPHET UR QL SCN: NEGATIVE
ANION GAP SERPL CALC-SCNC: 4 MMOL/L (ref 7–16)
APPEARANCE UR: CLEAR
AST SERPL-CCNC: 231 U/L (ref 15–37)
ATRIAL RATE: 83 BPM
BARBITURATES UR QL SCN: NEGATIVE
BASOPHILS # BLD: 0 K/UL (ref 0–0.2)
BASOPHILS NFR BLD: 1 % (ref 0–2)
BENZODIAZ UR QL: NEGATIVE
BILIRUB SERPL-MCNC: 0.3 MG/DL (ref 0.2–1.1)
BILIRUB UR QL: NEGATIVE
BUN SERPL-MCNC: 25 MG/DL (ref 8–23)
CALCIUM SERPL-MCNC: 8.4 MG/DL (ref 8.3–10.4)
CALCULATED P AXIS, ECG09: 53 DEGREES
CALCULATED R AXIS, ECG10: -44 DEGREES
CALCULATED T AXIS, ECG11: 37 DEGREES
CANNABINOIDS UR QL SCN: NEGATIVE
CHLORIDE SERPL-SCNC: 108 MMOL/L (ref 98–107)
CO2 SERPL-SCNC: 28 MMOL/L (ref 21–32)
COCAINE UR QL SCN: NEGATIVE
COLOR UR: YELLOW
CREAT SERPL-MCNC: 1.2 MG/DL (ref 0.8–1.5)
DIAGNOSIS, 93000: NORMAL
DIFFERENTIAL METHOD BLD: ABNORMAL
EOSINOPHIL # BLD: 0.2 K/UL (ref 0–0.8)
EOSINOPHIL NFR BLD: 4 % (ref 0.5–7.8)
ERYTHROCYTE [DISTWIDTH] IN BLOOD BY AUTOMATED COUNT: 12.2 % (ref 11.9–14.6)
ETHANOL SERPL-MCNC: <3 MG/DL
GLOBULIN SER CALC-MCNC: 4.1 G/DL (ref 2.3–3.5)
GLUCOSE SERPL-MCNC: 91 MG/DL (ref 65–100)
GLUCOSE UR STRIP.AUTO-MCNC: NEGATIVE MG/DL
HCT VFR BLD AUTO: 34.2 % (ref 41.1–50.3)
HGB BLD-MCNC: 11.1 G/DL (ref 13.6–17.2)
HGB UR QL STRIP: NEGATIVE
IMM GRANULOCYTES # BLD AUTO: 0 K/UL (ref 0–0.5)
IMM GRANULOCYTES NFR BLD AUTO: 1 % (ref 0–5)
KETONES UR QL STRIP.AUTO: NEGATIVE MG/DL
LEUKOCYTE ESTERASE UR QL STRIP.AUTO: NEGATIVE
LYMPHOCYTES # BLD: 1.4 K/UL (ref 0.5–4.6)
LYMPHOCYTES NFR BLD: 24 % (ref 13–44)
MAGNESIUM SERPL-MCNC: 1.5 MG/DL (ref 1.8–2.4)
MCH RBC QN AUTO: 31.2 PG (ref 26.1–32.9)
MCHC RBC AUTO-ENTMCNC: 32.5 G/DL (ref 31.4–35)
MCV RBC AUTO: 96.1 FL (ref 79.6–97.8)
METHADONE UR QL: NEGATIVE
MONOCYTES # BLD: 0.5 K/UL (ref 0.1–1.3)
MONOCYTES NFR BLD: 8 % (ref 4–12)
NEUTS SEG # BLD: 3.8 K/UL (ref 1.7–8.2)
NEUTS SEG NFR BLD: 63 % (ref 43–78)
NITRITE UR QL STRIP.AUTO: NEGATIVE
NRBC # BLD: 0 K/UL (ref 0–0.2)
OPIATES UR QL: NEGATIVE
P-R INTERVAL, ECG05: 194 MS
PCP UR QL: NEGATIVE
PH UR STRIP: 6 [PH] (ref 5–9)
PLATELET # BLD AUTO: 286 K/UL (ref 150–450)
PMV BLD AUTO: 10.7 FL (ref 9.4–12.3)
POTASSIUM SERPL-SCNC: 4.1 MMOL/L (ref 3.5–5.1)
POTASSIUM SERPL-SCNC: 6 MMOL/L (ref 3.5–5.1)
PROT SERPL-MCNC: 7.4 G/DL (ref 6.3–8.2)
PROT UR STRIP-MCNC: NEGATIVE MG/DL
Q-T INTERVAL, ECG07: 392 MS
QRS DURATION, ECG06: 108 MS
QTC CALCULATION (BEZET), ECG08: 460 MS
RBC # BLD AUTO: 3.56 M/UL (ref 4.23–5.6)
SODIUM SERPL-SCNC: 140 MMOL/L (ref 136–145)
SP GR UR REFRACTOMETRY: 1.02 (ref 1–1.02)
TSH SERPL DL<=0.005 MIU/L-ACNC: 1.47 UIU/ML (ref 0.36–3.74)
UROBILINOGEN UR QL STRIP.AUTO: 0.2 EU/DL (ref 0.2–1)
VENTRICULAR RATE, ECG03: 83 BPM
WBC # BLD AUTO: 6.1 K/UL (ref 4.3–11.1)

## 2020-02-14 PROCEDURE — 85025 COMPLETE CBC W/AUTO DIFF WBC: CPT

## 2020-02-14 PROCEDURE — 99284 EMERGENCY DEPT VISIT MOD MDM: CPT

## 2020-02-14 PROCEDURE — 80307 DRUG TEST PRSMV CHEM ANLYZR: CPT

## 2020-02-14 PROCEDURE — 74011250636 HC RX REV CODE- 250/636: Performed by: EMERGENCY MEDICINE

## 2020-02-14 PROCEDURE — 96374 THER/PROPH/DIAG INJ IV PUSH: CPT

## 2020-02-14 PROCEDURE — 82140 ASSAY OF AMMONIA: CPT

## 2020-02-14 PROCEDURE — 81003 URINALYSIS AUTO W/O SCOPE: CPT

## 2020-02-14 PROCEDURE — 84443 ASSAY THYROID STIM HORMONE: CPT

## 2020-02-14 PROCEDURE — 93005 ELECTROCARDIOGRAM TRACING: CPT | Performed by: EMERGENCY MEDICINE

## 2020-02-14 PROCEDURE — 80053 COMPREHEN METABOLIC PANEL: CPT

## 2020-02-14 PROCEDURE — 74011000250 HC RX REV CODE- 250: Performed by: EMERGENCY MEDICINE

## 2020-02-14 PROCEDURE — 96372 THER/PROPH/DIAG INJ SC/IM: CPT

## 2020-02-14 PROCEDURE — 83735 ASSAY OF MAGNESIUM: CPT

## 2020-02-14 PROCEDURE — 99285 EMERGENCY DEPT VISIT HI MDM: CPT

## 2020-02-14 PROCEDURE — 84132 ASSAY OF SERUM POTASSIUM: CPT

## 2020-02-14 RX ORDER — LORAZEPAM 2 MG/ML
1 INJECTION INTRAMUSCULAR
Status: COMPLETED | OUTPATIENT
Start: 2020-02-14 | End: 2020-02-14

## 2020-02-14 RX ADMIN — WATER 10 MG: 1 INJECTION INTRAMUSCULAR; INTRAVENOUS; SUBCUTANEOUS at 19:43

## 2020-02-14 RX ADMIN — LORAZEPAM 1 MG: 2 INJECTION, SOLUTION INTRAMUSCULAR; INTRAVENOUS at 19:01

## 2020-02-14 RX ADMIN — SODIUM CHLORIDE 500 ML: 900 INJECTION, SOLUTION INTRAVENOUS at 19:00

## 2020-02-14 NOTE — PROGRESS NOTES
911 activated. Pt t'ferred to [facility] ER. FIONA CM outreach with Bart Rodriguez RN CM and Jamila Barajas, JEAN PAUL JAIMES CM, to provide case update. This note will not be viewable in [address]

## 2020-02-14 NOTE — PROGRESS NOTES
[facility] accepts patient. They state that the patient needs fluids prior to transport. Committment papers started and given to MD.     Accepting MD: Dr. Sedrick Leung   Report: [phone]    Charge Nurse and MD updated.
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
## 2020-02-14 NOTE — ED NOTES
Patient from home via EMS. Patient has hx of dementia. EMS states that he is a/o x 4. Family believes that he has been altered more then normal. /78, , o2 98%. Patient states that he has no idea why he here. Patient states that he is with family. Dr Maria Isabel Pandya talking to family.

## 2020-02-14 NOTE — PROGRESS NOTES
Incoming call joe Foy Juan David at [facility]. Daughter called stating pt wandered off this a.m.   FIONA VICENTE contacted daughter. Explained that pt needed immediate evaluation, that based on behaviors he was a potential danger to himself or others. Further explained that referrals to a psychiatrist or new neurologist could take months before pt could be seen. Daughter stated that her mother was resistant to calling 911. FIONA VICENTE later spoke with pt's spouse and explained her options , as above. Encouraged them to call 911 immediately and have pt transported to the ER where he could be evaluated and hopefully t'ferred to [facility] for [facility] for ongoing med eval and stabilization. Jose, Ms. Cony, has FIONA VICENTE's contact # and was encouraged to call if further assistance was needed. Mary Minaya RN updated. This note will not be viewable in [address]

## 2020-02-15 ENCOUNTER — PATIENT OUTREACH (OUTPATIENT)
Dept: CASE MANAGEMENT | Age: 70
End: 2020-02-15

## 2020-02-15 NOTE — PROGRESS NOTES
Chart review. Pt transferred from [facility] ER to [facility] for [address] on Friday evening for further observation and evaluation. Dr. Geraldyne Frankel office updated. FIONA VICENTE will outreach with pt's step-emely, MsHolly [place], on Monday, 2/17. This note will not be viewable in [address]

## 2020-02-15 NOTE — Clinical Note
Pt transferred to [facility] for [address] from ER Friday evening. I will follow up on MondayKathy

## 2020-02-15 NOTE — ED NOTES
TRANSFER - OUT REPORT:    Verbal report given to Cristina Mcadams RN (name) on Cedric Marshall  being transferred to Unit 7(unit) for routine progression of care       Report consisted of patients Situation, Background, Assessment and   Recommendations(SBAR). Information from the following report(s) SBAR and ED Summary was reviewed with the receiving nurse. Lines:   Peripheral IV 02/14/20 Right Antecubital (Active)   Site Assessment Clean, dry, & intact 2/14/2020  7:01 PM   Phlebitis Assessment 0 2/14/2020  7:01 PM   Infiltration Assessment 0 2/14/2020  7:01 PM   Dressing Status Clean, dry, & intact 2/14/2020  7:01 PM        Opportunity for questions and clarification was provided.       Patient transported with:  [facility] Anna Marie

## 2020-02-17 ENCOUNTER — PATIENT OUTREACH (OUTPATIENT)
Dept: CASE MANAGEMENT | Age: 70
End: 2020-02-17

## 2020-02-17 NOTE — PROGRESS NOTES
FIONA VICENTE phone outreach with Skinny Greer, pt's step emely. Her uncle passed away suddenly this a.m. Family is tied up with this as well, making arrangements, etc.   Pt remains at [facility]. Therapist spoke with pt's spouse this morning. FIONA VICENTE following psych admission. PLAN  Outreach next week     This note will not be viewable in [address]

## 2020-02-20 ENCOUNTER — PATIENT OUTREACH (OUTPATIENT)
Dept: CASE MANAGEMENT | Age: 70
End: 2020-02-20

## 2020-02-21 ENCOUNTER — PATIENT OUTREACH (OUTPATIENT)
Dept: CASE MANAGEMENT | Age: 70
End: 2020-02-21

## 2020-02-21 NOTE — Clinical Note
Harrison today's note. Please inform Dr. Chris Bach and request he consider making a referral to [facility]. Thanks so much! Bryan Hollins [id number]

## 2020-02-21 NOTE — PROGRESS NOTES
Incoming call from Miranda Davis,  at [facility] for [address]. Pt is stabilizing. They've added Effexor, Risperdal, Provera (for agitation) , Trileptal, Remeron. Possible dc next week. Plan is to dc pt with [facility]  nursing for medication education, monitoring and home health social work to eval care giving system and provide resources/support. Family has limited financial resources so long term placement, even if family is agreeable, will require applying for Medicaid which can take several months. Will try to put as many supports in the home as possible. Referral to [facility] would be beneficial as well for ongoing dementia mgmt and  support/education for family. Will request PCP referral.    FIONA VICENTE will touch base with [facility]  mid next week. Dr. Mariam Patel office updated. This note will not be viewable in [address]

## 2020-02-24 ENCOUNTER — PATIENT OUTREACH (OUTPATIENT)
Dept: CASE MANAGEMENT | Age: 70
End: 2020-02-24

## 2020-02-24 NOTE — PROGRESS NOTES
FIONA VICENTE outreach with pt's spouse, Rod Petty. She deferred to daughter, Jong Loera. They visited pt yesterday. Still delusional.   Have concerns about him coming home and trying to get out of the house. DC likely this week , per [facility] FIONA.  Pt will be dc'd with [facility] [address] [facility] and SW. May need placement down the road depending on how he does when he gets home. However, family has limited resources so finding a suitable placement will take some time. Pt is not a [place]. PLAN  Follow [facility] admission  Assess behaviors/family's ability to manage pt after dc home  Collaborate with [facility] staff in making appropriate referrals. [facility] referral - assess interest      ADDENDUM:  Incoming call from FIONA at [facility], [address]. Discussed DC plan. FIONA VICENTE relayed family's concerns about bringing pt home and limited resources for placement. There is a possibility of a secure placement at [facility] senior care in memory care - $1600/1700/mo. He will discuss affordability of this and interest in placement with family. This note will not be viewable in [address]

## 2020-02-25 ENCOUNTER — PATIENT OUTREACH (OUTPATIENT)
Dept: CASE MANAGEMENT | Age: 70
End: 2020-02-25

## 2020-02-25 NOTE — PROGRESS NOTES
Incoming call from Tatiana David, from [facility] for [address]. He spoke with pt's step emely yesterday. There is a placement possibility available at [facility].  from the facility will be calling step emely to see if they can work out a financial arrangement for pt to be placed there. Pt's hearing is tomorrow. Likely dc from [facility] this week. This note will not be viewable in [address]

## 2020-02-27 ENCOUNTER — PATIENT OUTREACH (OUTPATIENT)
Dept: CASE MANAGEMENT | Age: 70
End: 2020-02-27

## 2020-03-09 ENCOUNTER — PATIENT OUTREACH (OUTPATIENT)
Dept: CASE MANAGEMENT | Age: 70
End: 2020-03-09

## 2020-03-09 NOTE — PROGRESS NOTES
Follow up call with pt's Micki woods. Pt at [facility] for one week today. Having problems adjusting, wants to come home. SW CM normalized \"transition trauma\" for both pt and family members. Encouraged patience with process, and allowing time for pt to transition/adapt to his new environment. Suggested she speak with resident  at facility for suggestions on how family/facility staff can participate together in making transition as smooth as possible. Goal timeline revised. Goals      Facilitate transitions of care (ie. palliative care, assisted living, nursing home, changes in living arrangements). Pt will successfully transition to [address] [place] [address] and stabilize on meds -   Re eval 3/30/20          PLAN  Outreach in 2 weeks. This note will not be viewable in [address]

## 2020-03-26 ENCOUNTER — PATIENT OUTREACH (OUTPATIENT)
Dept: CASE MANAGEMENT | Age: 70
End: 2020-03-26

## 2020-03-26 NOTE — PROGRESS NOTES
FIONA VICENTE follow up outreach with pt's daughter, Mickey Ignacio. Pt is still at [address] [place] [address]. Now under lockdown, no visitors due to [place] 19. Pt's wife able to converse with him by phone. Provided support, encouragement. PLAN  FIONA VICENTE will extend involvement until end of April  Outreach in 2-3 weeks. Goals      Facilitate transitions of care (ie. palliative care, assisted living, nursing home, changes in living arrangements).       Pt will successfully transition to [address] [place] [address] and stabilize on meds -   Re eval 4/26/20

## 2020-04-23 ENCOUNTER — PATIENT OUTREACH (OUTPATIENT)
Dept: CASE MANAGEMENT | Age: 70
End: 2020-04-23

## 2020-04-23 NOTE — PROGRESS NOTES
FIONA VICENTE outreach with pt's daughter, Tiffani Garland. Pt is still at [address] and the facility is still on lock down due to COVID 19. Daughter states that pt talks with her mother every night and \"cries to come home. \"  Provided emotional support and encouragement. Both emely and pt's spouse accept the reality that 24/7 care cannot be provided to pt at home. FIONA VICENTE suggested that emely and her mother contact DON or NP at the facility, discuss concerns about pt's adjustment and possible medications or other suggestions  that might be considered to assist him in coping with his new living environment. PLAN  Extend FIONA VICENTE involvement until 5/13  Outreach again in 2-3 weeks. Goals      Facilitate transitions of care (ie. palliative care, assisted living, nursing home, changes in living arrangements).       Pt will successfully transition to [facility] and stabilize on meds -   Re eval 5/15/20

## 2020-05-07 ENCOUNTER — PATIENT OUTREACH (OUTPATIENT)
Dept: CASE MANAGEMENT | Age: 70
End: 2020-05-07

## 2020-05-07 NOTE — PROGRESS NOTES
FIONA VICENTE attempted follow up outreach call to pt's emely, Saranya Donnelly. LM on  for call back.

## 2020-05-12 ENCOUNTER — PATIENT OUTREACH (OUTPATIENT)
Dept: CASE MANAGEMENT | Age: 70
End: 2020-05-12

## 2020-05-12 NOTE — PROGRESS NOTES
FIONA VICENTE outreach with pt's wife, Tayler Ambrose. [facility] is still on lock down due to COVID 19. Family unable to visit. Spouse talks with pt on the phone. States that pt \"isn't happy there\" and wants to come home. She is aware she cannot care for him at home but struggles b/c pt is so unhappy. FIONA VICENTE provided support and affirmed the difficulty of transitioning a pt to a facility under normal circumstances, made worse by the visiting restrictions that have come with the virus. Explored spouse's interest in attending an [facility] caregiver support group. She would be interested but face to face meetings are on hold right now. FIONA VICENTE will contact the local [place] chapter and see what telephonic offerings might be available. Spouse isn't sure but thinks that her daughter, Brittani Ramachandran, did speak with facility staff about having the NP eval pt for placement on a med that might help with his agitation/depression. She's not sure if this happened or not. FIONA VICENTE outreached with daughter. CHAN for call back on VM . FIONA VICENTE also suggested that spouse consider getting some family photos together, frame them and deliver to the [facility]. A visual connection with family members might be of comfort to pt when talking with them by phone.        PLAN  Extend FIONA VICENTE involvement until 6/15  Outreach again in 2-3 weeks. Goals Addressed                 This Visit's Progress     Facilitate transitions of care (ie. palliative care, assisted living, nursing home, changes in living arrangements). Not on track     Pt will successfully transition to [address] [place] [address] and stabilize on meds -   Re eval 6/15/20            This note will not be viewable in [facility].

## 2020-05-14 ENCOUNTER — HOSPITAL ENCOUNTER (EMERGENCY)
Age: 70
Discharge: HOME | End: 2020-05-14
Attending: EMERGENCY MEDICINE
Payer: MEDICARE

## 2020-05-14 VITALS
RESPIRATION RATE: 16 BRPM | BODY MASS INDEX: 21.84 KG/M2 | WEIGHT: 185 LBS | HEIGHT: 77 IN | OXYGEN SATURATION: 95 % | HEART RATE: 58 BPM | DIASTOLIC BLOOD PRESSURE: 63 MMHG | SYSTOLIC BLOOD PRESSURE: 149 MMHG | TEMPERATURE: 98.1 F

## 2020-05-14 DIAGNOSIS — E86.0 DEHYDRATION: ICD-10-CM

## 2020-05-14 DIAGNOSIS — R53.1 WEAKNESS: Primary | ICD-10-CM

## 2020-05-14 LAB
ALBUMIN SERPL-MCNC: 3.3 G/DL (ref 3.2–4.6)
ALBUMIN/GLOB SERPL: 0.9 {RATIO} (ref 1.2–3.5)
ALP SERPL-CCNC: 79 U/L (ref 50–136)
ALT SERPL-CCNC: 19 U/L (ref 12–65)
ANION GAP SERPL CALC-SCNC: 6 MMOL/L (ref 7–16)
AST SERPL-CCNC: 10 U/L (ref 15–37)
BASOPHILS # BLD: 0.1 K/UL (ref 0–0.2)
BASOPHILS NFR BLD: 1 % (ref 0–2)
BILIRUB SERPL-MCNC: 0.2 MG/DL (ref 0.2–1.1)
BUN SERPL-MCNC: 23 MG/DL (ref 8–23)
CALCIUM SERPL-MCNC: 8.4 MG/DL (ref 8.3–10.4)
CHLORIDE SERPL-SCNC: 108 MMOL/L (ref 98–107)
CO2 SERPL-SCNC: 29 MMOL/L (ref 21–32)
CREAT SERPL-MCNC: 1.22 MG/DL (ref 0.8–1.5)
DIFFERENTIAL METHOD BLD: ABNORMAL
EOSINOPHIL # BLD: 0.3 K/UL (ref 0–0.8)
EOSINOPHIL NFR BLD: 5 % (ref 0.5–7.8)
ERYTHROCYTE [DISTWIDTH] IN BLOOD BY AUTOMATED COUNT: 13.5 % (ref 11.9–14.6)
GLOBULIN SER CALC-MCNC: 3.5 G/DL (ref 2.3–3.5)
GLUCOSE SERPL-MCNC: 98 MG/DL (ref 65–100)
HCT VFR BLD AUTO: 27 % (ref 41.1–50.3)
HEMOCCULT STL QL: NEGATIVE
HGB BLD-MCNC: 8.6 G/DL (ref 13.6–17.2)
IMM GRANULOCYTES # BLD AUTO: 0 K/UL (ref 0–0.5)
IMM GRANULOCYTES NFR BLD AUTO: 0 % (ref 0–5)
LACTATE SERPL-SCNC: 0.9 MMOL/L (ref 0.4–2)
LYMPHOCYTES # BLD: 2.1 K/UL (ref 0.5–4.6)
LYMPHOCYTES NFR BLD: 33 % (ref 13–44)
MCH RBC QN AUTO: 30.4 PG (ref 26.1–32.9)
MCHC RBC AUTO-ENTMCNC: 31.9 G/DL (ref 31.4–35)
MCV RBC AUTO: 95.4 FL (ref 79.6–97.8)
MONOCYTES # BLD: 0.7 K/UL (ref 0.1–1.3)
MONOCYTES NFR BLD: 11 % (ref 4–12)
NEUTS SEG # BLD: 3.2 K/UL (ref 1.7–8.2)
NEUTS SEG NFR BLD: 50 % (ref 43–78)
NRBC # BLD: 0 K/UL (ref 0–0.2)
PLATELET # BLD AUTO: 216 K/UL (ref 150–450)
PMV BLD AUTO: 10.5 FL (ref 9.4–12.3)
POTASSIUM SERPL-SCNC: 4.1 MMOL/L (ref 3.5–5.1)
PROT SERPL-MCNC: 6.8 G/DL (ref 6.3–8.2)
RBC # BLD AUTO: 2.83 M/UL (ref 4.23–5.6)
SODIUM SERPL-SCNC: 143 MMOL/L (ref 136–145)
WBC # BLD AUTO: 6.4 K/UL (ref 4.3–11.1)

## 2020-05-14 PROCEDURE — 99285 EMERGENCY DEPT VISIT HI MDM: CPT

## 2020-05-14 PROCEDURE — 74011250636 HC RX REV CODE- 250/636: Performed by: EMERGENCY MEDICINE

## 2020-05-14 PROCEDURE — 85025 COMPLETE CBC W/AUTO DIFF WBC: CPT

## 2020-05-14 PROCEDURE — 96360 HYDRATION IV INFUSION INIT: CPT

## 2020-05-14 PROCEDURE — 81003 URINALYSIS AUTO W/O SCOPE: CPT

## 2020-05-14 PROCEDURE — 83605 ASSAY OF LACTIC ACID: CPT

## 2020-05-14 PROCEDURE — 80053 COMPREHEN METABOLIC PANEL: CPT

## 2020-05-14 PROCEDURE — 82270 OCCULT BLOOD FECES: CPT

## 2020-05-14 RX ADMIN — SODIUM CHLORIDE 1000 ML: 900 INJECTION, SOLUTION INTRAVENOUS at 16:10
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
## 2020-05-14 NOTE — ED TRIAGE NOTES
Patient comes via ems from [facility]. Per facility patient recently treated for uti, antibiotics completed on May 9th. Facility states patient has become more weak and now unable to walk without assistance.

## 2020-05-14 NOTE — DISCHARGE INSTRUCTIONS
Patient Education        Fatigue: Care Instructions  Your Care Instructions    Fatigue is a feeling of tiredness, exhaustion, or lack of energy. You may feel fatigue because of too much or not enough activity. It can also come from stress, lack of sleep, boredom, and poor diet. Many medical problems, such as viral infections, can cause fatigue. Emotional problems, especially depression, are often the cause of fatigue. Fatigue is most often a symptom of another problem. Treatment for fatigue depends on the cause. For example, if you have fatigue because you have a certain health problem, treating this problem also treats your fatigue. If depression or anxiety is the cause, treatment may help. Follow-up care is a key part of your treatment and safety. Be sure to make and go to all appointments, and call your doctor if you are having problems. It's also a good idea to know your test results and keep a list of the medicines you take. How can you care for yourself at home? · Get regular exercise. But don't overdo it. Go back and forth between rest and exercise. · Get plenty of rest.  · Eat a healthy diet. Do not skip meals, especially breakfast.  · Reduce your use of caffeine, tobacco, and alcohol. Caffeine is most often found in coffee, tea, cola drinks, and chocolate. · Limit medicines that can cause fatigue. This includes tranquilizers and cold and allergy medicines. When should you call for help? Watch closely for changes in your health, and be sure to contact your doctor if:    · You have new symptoms such as fever or a rash.     · Your fatigue gets worse.     · You have been feeling down, depressed, or hopeless. Or you may have lost interest in things that you usually enjoy.     · You are not getting better as expected. Where can you learn more? Go to http://yoni-lainey.info/  Enter [id number] in the search box to learn more about \"Fatigue: Care Instructions. \"  Current as of: June 26, 2019Content Version: 12.4  © 6893-7317 [employer]. Care instructions adapted under license by [employer] (which disclaims liability or warranty for this information). If you have questions about a medical condition or this instruction, always ask your healthcare professional. [address] any warranty or liability for your use of this information. Patient Education        Dehydration: Care Instructions  Your Care Instructions  Dehydration happens when your body loses too much fluid. This might happen when you do not drink enough water or you lose large amounts of fluids from your body because of diarrhea, vomiting, or sweating. Severe dehydration can be life-threatening. Water and minerals called electrolytes help put your body fluids back in balance. Learn the early signs of fluid loss, and drink more fluids to prevent dehydration. Follow-up care is a key part of your treatment and safety. Be sure to make and go to all appointments, and call your doctor if you are having problems. It's also a good idea to know your test results and keep a list of the medicines you take. How can you care for yourself at home? · To prevent dehydration, drink plenty of fluids, enough so that your urine is light yellow or clear like water. Choose water and other caffeine-free clear liquids until you feel better. If you have kidney, heart, or liver disease and have to limit fluids, talk with your doctor before you increase the amount of fluids you drink. · If you do not feel like eating or drinking, try taking small sips of water, sports drinks, or other rehydration drinks. · Get plenty of rest.  To prevent dehydration  · Add more fluids to your diet and daily routine, unless your doctor has told you not to. · During hot weather, drink more fluids. Drink even more fluids if you exercise a lot. Stay away from drinks with alcohol or caffeine.   · Watch for the symptoms of dehydration. These include:  ? A dry, sticky mouth. ? Dark yellow urine, and not much of it. ? Dry and sunken eyes. ? Feeling very tired. · Learn what problems can lead to dehydration. These include:  ? Diarrhea, fever, and vomiting. ? Any illness with a fever, such as pneumonia or the flu. ? Activities that cause heavy sweating, such as endurance races and heavy outdoor work in hot or humid weather. ? Alcohol or drug use or problems caused by quitting their use (withdrawal). ? Certain medicines, such as cold and allergy pills (antihistamines), diet pills (diuretics), and laxatives. ? Certain diseases, such as diabetes, cancer, and heart or kidney disease. When should you call for help? Call 911 anytime you think you may need emergency care. For example, call if:    · You passed out (lost consciousness).    Call your doctor now or seek immediate medical care if:    · You are confused and cannot think clearly.     · You are dizzy or lightheaded, or you feel like you may faint.     · You have signs of needing more fluids. You have sunken eyes and a dry mouth, and you pass only a little dark urine.     · You cannot keep fluids down.    Watch closely for changes in your health, and be sure to contact your doctor if:    · You are not making tears.     · Your skin is very dry and sags slowly back into place after you pinch it.     · Your mouth and eyes are very dry. Where can you learn more? Go to http://yoni-lainey.info/  Enter Q814 in the search box to learn more about \"Dehydration: Care Instructions. \"  Current as of: June 26, 2019Content Version: 12.4  © 0007-7763 [employer]. Care instructions adapted under license by [employer] (which disclaims liability or warranty for this information).  If you have questions about a medical condition or this instruction, always ask your healthcare professional. Sharan Muñoz disclaims any warranty or liability for your use of this information. Patient Education        Weakness: Care Instructions  Your Care Instructions    Weakness is a lack of physical or muscle strength. You may feel that you need to make extra effort to move your arms, legs, or other muscles. Generalized weakness means that you feel weak in most areas of your body. Another type of weakness may affect just one muscle or group of muscles. You may feel weak and tired after you have done too much activity, such as taking an extra-long hike. This is not a serious problem. It often goes away on its own. Feeling weak can also be caused by medical conditions like thyroid problems, depression, or a virus. Sometimes the cause can be serious. Your doctor may want to do more tests to try to find the cause of the weakness. The doctor has checked you carefully, but problems can develop later. If you notice any problems or new symptoms, get medical treatment right away. Follow-up care is a key part of your treatment and safety. Be sure to make and go to all appointments, and call your doctor if you are having problems. It's also a good idea to know your test results and keep a list of the medicines you take. How can you care for yourself at home? · Rest when you feel tired. · Be safe with medicines. If your doctor prescribed medicine, take it exactly as prescribed. Call your doctor if you think you are having a problem with your medicine. You will get more details on the specific medicines your doctor prescribes. · Do not skip meals. Eating a balanced diet may increase your energy level. · Get some physical activity every day, but do not get too tired. When should you call for help?   Call your doctor now or seek immediate medical care if:    · You have new or worse weakness.     · You are dizzy or lightheaded, or you feel like you may faint.    Watch closely for changes in your health, and be sure to contact your doctor if:    · You do not get better as expected. Where can you learn more? Go to http://yoni-lainey.info/  Enter V492 in the search box to learn more about \"Weakness: Care Instructions. \"  Current as of: June 26, 2019Content Version: 12.4  © 3200-6706 [employer]wise, Incorporated. Care instructions adapted under license by [employer] (which disclaims liability or warranty for this information). If you have questions about a medical condition or this instruction, always ask your healthcare professional. [address] any warranty or liability for your use of this information.

## 2020-05-15 ENCOUNTER — PATIENT OUTREACH (OUTPATIENT)
Dept: CASE MANAGEMENT | Age: 70
End: 2020-05-15

## 2020-05-15 NOTE — PROGRESS NOTES
5/14 - Incoming call from pt's jose, Micki Donnelly. Pt transferred to ER from [address] [place] [address] with new onset weakness, dehydration. Jose upset b/c she does not feel pt is getting adequate care at the facility. 5/15 - FIONA VICENTE outreach with pt's jose to check on status. The family has decided to bring pt home. He will be followed by [address] [place] [address] who was providing primary care services in the facility and will now make home visits. HH will be ordered through [facility] and family will hire private sitters to assist with care. FIONA VICENTE emailed Ms. Donnelly a listing on non medical home care agencies in the area. [facility] will hold pt's bed for a month or so in the event home care doesn't work out. Jose and family displeased with care pt has been receiving . Jose may consider a call to the [facility]. Goal revised to reflect care transition to home. Outreach late next week. Dr. Faviola Fischer office updated. This note will not be viewable in [address]

## 2020-05-15 NOTE — PROGRESS NOTES
Patient DC from ED back ti [facility]. Patient will be followed per facility COVID guidelines, no outreach indicated at this time.

## 2020-05-22 ENCOUNTER — PATIENT OUTREACH (OUTPATIENT)
Dept: CASE MANAGEMENT | Age: 70
End: 2020-05-22

## 2020-05-22 NOTE — PROGRESS NOTES
FIONA VICENTE attempted follow up outreach with pt's emely, Edna Donnelly. She is unavailable to talk and will call FIONA VICENTE back at a later time.

## 2020-05-29 ENCOUNTER — PATIENT OUTREACH (OUTPATIENT)
Dept: CASE MANAGEMENT | Age: 70
End: 2020-05-29

## 2020-05-29 NOTE — PROGRESS NOTES
FIONA VICENTE attempted follow up outreach call with pt's daughter. Unable to lv message. VM box is full. Attempted outreach with pt's spouse. LM for call back on VM.

## 2020-06-10 ENCOUNTER — PATIENT OUTREACH (OUTPATIENT)
Dept: CASE MANAGEMENT | Age: 70
End: 2020-06-10

## 2020-06-10 NOTE — PROGRESS NOTES
FIONA VICENTE attempted outreach with pt's daughter, Rob Maria. Unable to lv message b/c mailbox was full. FIONA VICENTE contacted pt's spouse. She informs that pt is now with hospice but is not feeling well and isn't sure which hospice is now providing services. FIONA VICENTE noted in chart where [facility] had contacted PCP office recently. [facility] contacted [facility] and was informed that pt was admitted to [facility] today. Pt will be graduated from case management services as hospice has assumed responsibility for pt's care. Dr. Yoli Soto updated.

## (undated) DEVICE — 2000CC GUARDIAN II: Brand: GUARDIAN

## (undated) DEVICE — DEVICE INFL PRSS G INDIC DISP (MUST BE PURC IN MULTIPLES OF 5)

## (undated) DEVICE — PACKING 8004008 NEURAY 200PK 25X76MM: Brand: NEURAY ®

## (undated) DEVICE — HEAD AND NECK: Brand: MEDLINE INDUSTRIES, INC.

## (undated) DEVICE — BALLOON SINUPLASTY 6X16 MM SYS RELIEVA SPINPLUS

## (undated) DEVICE — SOL ANTI-FOG 6ML MEDC -- MEDICHOICE - CONVERT TO 358427

## (undated) DEVICE — SYR LR LCK 1ML GRAD NSAF 30ML --

## (undated) DEVICE — SOLUTION IV 1000ML 0.9% SOD CHL

## (undated) DEVICE — SOLUTION LACTATED RINGERS INJECTION USP

## (undated) DEVICE — BLADE 1884004HR TRICUT 5PK M4 4MM ROTATE: Brand: TRICUT

## (undated) DEVICE — SUT SLK 2-0 18IN FS BLK --

## (undated) DEVICE — REFLEX ULTRA 45 WITH INTEGRATED CABLE: Brand: COBLATION

## (undated) DEVICE — SPLINT NSL SEPTAL SUPP REG PRE PUNCHED HOLE SIL STRL BRTH EZ

## (undated) DEVICE — SUTURE CHROMIC GUT 4-0 L18IN ABSRB PS-4C L16MM 1/2 CIR 2644G

## (undated) DEVICE — OINTMENT BACITRACIN 1 OZ TUBE --

## (undated) DEVICE — MEDI-VAC NON-CONDUCTIVE SUCTION TUBING: Brand: CARDINAL HEALTH